# Patient Record
Sex: FEMALE | Race: WHITE | URBAN - METROPOLITAN AREA
[De-identification: names, ages, dates, MRNs, and addresses within clinical notes are randomized per-mention and may not be internally consistent; named-entity substitution may affect disease eponyms.]

---

## 2021-11-19 ENCOUNTER — OFFICE VISIT (OUTPATIENT)
Dept: URBAN - METROPOLITAN AREA CLINIC 19 | Facility: CLINIC | Age: 71
End: 2021-11-19
Payer: MEDICARE

## 2021-11-19 ENCOUNTER — WEB ENCOUNTER (OUTPATIENT)
Dept: URBAN - METROPOLITAN AREA CLINIC 19 | Facility: CLINIC | Age: 71
End: 2021-11-19

## 2021-11-19 ENCOUNTER — TELEPHONE ENCOUNTER (OUTPATIENT)
Dept: URBAN - METROPOLITAN AREA CLINIC 19 | Facility: CLINIC | Age: 71
End: 2021-11-19

## 2021-11-19 DIAGNOSIS — K59.09 CHANGE IN BOWEL MOVEMENTS INTERMITTENT CONSTIPATION. URGENCY IN THE MORNING.: ICD-10-CM

## 2021-11-19 DIAGNOSIS — Z86.010 HISTORY OF COLON POLYPS: ICD-10-CM

## 2021-11-19 DIAGNOSIS — R10.13 DYSPEPSIA: ICD-10-CM

## 2021-11-19 DIAGNOSIS — R12 HEARTBURN: ICD-10-CM

## 2021-11-19 DIAGNOSIS — K44.9 HIATAL HERNIA: ICD-10-CM

## 2021-11-19 PROCEDURE — 99204 OFFICE O/P NEW MOD 45 MIN: CPT | Performed by: INTERNAL MEDICINE

## 2021-11-19 PROCEDURE — 99244 OFF/OP CNSLTJ NEW/EST MOD 40: CPT | Performed by: INTERNAL MEDICINE

## 2021-11-19 RX ORDER — DEXLANSOPRAZOLE 60 MG/1
1 CAPSULE CAPSULE, DELAYED RELEASE ORAL ONCE A DAY
Qty: 90 CAPSULE | Refills: 3 | OUTPATIENT
Start: 2021-11-19

## 2021-11-19 RX ORDER — PLECANATIDE 3 MG/1
1 TABLET TABLET ORAL ONCE A DAY
Qty: 90 TABLET | Refills: 3 | OUTPATIENT
Start: 2021-12-02 | End: 2022-11-27

## 2021-11-19 RX ORDER — LINACLOTIDE 145 UG/1
1 CAPSULE AT LEAST 30 MINUTES BEFORE THE FIRST MEAL OF THE DAY ON AN EMPTY STOMACH CAPSULE, GELATIN COATED ORAL ONCE A DAY
Qty: 90 | Refills: 3 | OUTPATIENT
Start: 2021-11-19 | End: 2022-11-14

## 2021-11-19 RX ORDER — LINACLOTIDE 145 UG/1
1 CAPSULE AT LEAST 30 MINUTES BEFORE THE FIRST MEAL OF THE DAY ON AN EMPTY STOMACH CAPSULE, GELATIN COATED ORAL ONCE A DAY
Qty: 90 | Refills: 3 | Status: ACTIVE | COMMUNITY
Start: 2021-11-19 | End: 2022-11-14

## 2021-11-19 RX ORDER — DEXLANSOPRAZOLE 60 MG/1
1 CAPSULE CAPSULE, DELAYED RELEASE ORAL ONCE A DAY
Qty: 90 CAPSULE | Refills: 3 | Status: ACTIVE | COMMUNITY
Start: 2021-11-19

## 2022-01-03 ENCOUNTER — TELEPHONE ENCOUNTER (OUTPATIENT)
Dept: URBAN - METROPOLITAN AREA CLINIC 19 | Facility: CLINIC | Age: 72
End: 2022-01-03

## 2022-01-03 ENCOUNTER — OFFICE VISIT (OUTPATIENT)
Dept: URBAN - METROPOLITAN AREA MEDICAL CENTER 25 | Facility: MEDICAL CENTER | Age: 72
End: 2022-01-03
Payer: MEDICARE

## 2022-01-03 ENCOUNTER — OFFICE VISIT (OUTPATIENT)
Dept: URBAN - METROPOLITAN AREA LAB 2 | Facility: LAB | Age: 72
End: 2022-01-03

## 2022-01-03 DIAGNOSIS — K29.60 ADENOPAPILLOMATOSIS GASTRICA: ICD-10-CM

## 2022-01-03 DIAGNOSIS — K22.89 ESOPHAGEAL BLEEDING: ICD-10-CM

## 2022-01-03 DIAGNOSIS — R11.11 INTRACTABLE VOMITING WITHOUT NAUSEA: ICD-10-CM

## 2022-01-03 PROCEDURE — 43239 EGD BIOPSY SINGLE/MULTIPLE: CPT | Performed by: INTERNAL MEDICINE

## 2022-01-03 RX ORDER — DEXLANSOPRAZOLE 60 MG/1
1 CAPSULE CAPSULE, DELAYED RELEASE ORAL ONCE A DAY
Qty: 90 CAPSULE | Refills: 3 | Status: ACTIVE | COMMUNITY
Start: 2021-11-19

## 2022-01-03 RX ORDER — LINACLOTIDE 145 UG/1
1 CAPSULE AT LEAST 30 MINUTES BEFORE THE FIRST MEAL OF THE DAY ON AN EMPTY STOMACH CAPSULE, GELATIN COATED ORAL ONCE A DAY
Qty: 90 | Refills: 3 | Status: ACTIVE | COMMUNITY
Start: 2021-11-19 | End: 2022-11-14

## 2022-01-03 RX ORDER — PLECANATIDE 3 MG/1
1 TABLET TABLET ORAL ONCE A DAY
Qty: 90 TABLET | Refills: 3 | Status: ACTIVE | COMMUNITY
Start: 2021-12-02 | End: 2022-11-27

## 2022-01-11 PROBLEM — 263854006 REGURGITATION: Status: ACTIVE | Noted: 2022-01-03

## 2022-01-21 ENCOUNTER — TELEPHONE ENCOUNTER (OUTPATIENT)
Dept: URBAN - METROPOLITAN AREA CLINIC 19 | Facility: CLINIC | Age: 72
End: 2022-01-21

## 2022-01-21 RX ORDER — PLECANATIDE 3 MG/1
1 TABLET TABLET ORAL ONCE A DAY
Qty: 90 TABLET | Refills: 3 | Status: ACTIVE | COMMUNITY
Start: 2021-12-02 | End: 2022-11-27

## 2022-01-21 RX ORDER — FAMOTIDINE 40 MG/1
1 TABLET AT BEDTIME TABLET, FILM COATED ORAL ONCE A DAY
Qty: 90 TABLET | Refills: 1 | OUTPATIENT
Start: 2022-01-26

## 2022-01-21 RX ORDER — DEXLANSOPRAZOLE 60 MG/1
1 CAPSULE CAPSULE, DELAYED RELEASE ORAL ONCE A DAY
Qty: 90 CAPSULE | Refills: 3 | Status: ACTIVE | COMMUNITY
Start: 2021-11-19

## 2022-01-21 RX ORDER — LINACLOTIDE 145 UG/1
1 CAPSULE AT LEAST 30 MINUTES BEFORE THE FIRST MEAL OF THE DAY ON AN EMPTY STOMACH CAPSULE, GELATIN COATED ORAL ONCE A DAY
Qty: 90 | Refills: 3 | Status: ACTIVE | COMMUNITY
Start: 2021-11-19 | End: 2022-11-14

## 2022-01-24 ENCOUNTER — OFFICE VISIT (OUTPATIENT)
Dept: URBAN - METROPOLITAN AREA LAB 2 | Facility: LAB | Age: 72
End: 2022-01-24

## 2022-02-01 ENCOUNTER — TELEPHONE ENCOUNTER (OUTPATIENT)
Dept: URBAN - METROPOLITAN AREA CLINIC 19 | Facility: CLINIC | Age: 72
End: 2022-02-01

## 2022-02-04 ENCOUNTER — OFFICE VISIT (OUTPATIENT)
Dept: URBAN - METROPOLITAN AREA CLINIC 19 | Facility: CLINIC | Age: 72
End: 2022-02-04
Payer: MEDICARE

## 2022-02-04 ENCOUNTER — WEB ENCOUNTER (OUTPATIENT)
Dept: URBAN - METROPOLITAN AREA CLINIC 19 | Facility: CLINIC | Age: 72
End: 2022-02-04

## 2022-02-04 DIAGNOSIS — R10.13 DYSPEPSIA: ICD-10-CM

## 2022-02-04 DIAGNOSIS — R12 HEARTBURN: ICD-10-CM

## 2022-02-04 DIAGNOSIS — Z86.010 HISTORY OF COLON POLYPS: ICD-10-CM

## 2022-02-04 DIAGNOSIS — K44.9 HIATAL HERNIA: ICD-10-CM

## 2022-02-04 PROBLEM — 162031009 DYSPEPSIA: Status: ACTIVE | Noted: 2021-11-19

## 2022-02-04 PROCEDURE — 99213 OFFICE O/P EST LOW 20 MIN: CPT | Performed by: INTERNAL MEDICINE

## 2022-02-04 RX ORDER — LINACLOTIDE 145 UG/1
1 CAPSULE AT LEAST 30 MINUTES BEFORE THE FIRST MEAL OF THE DAY ON AN EMPTY STOMACH CAPSULE, GELATIN COATED ORAL ONCE A DAY
Qty: 90 | Refills: 3 | Status: ACTIVE | COMMUNITY
Start: 2021-11-19 | End: 2022-11-14

## 2022-02-04 RX ORDER — DEXLANSOPRAZOLE 60 MG/1
1 CAPSULE CAPSULE, DELAYED RELEASE ORAL ONCE A DAY
Qty: 90 CAPSULE | Refills: 3 | Status: ACTIVE | COMMUNITY
Start: 2021-11-19

## 2022-02-04 RX ORDER — FAMOTIDINE 40 MG/1
1 TABLET AT BEDTIME TABLET, FILM COATED ORAL ONCE A DAY
Qty: 90 TABLET | Refills: 1 | Status: ACTIVE | COMMUNITY
Start: 2022-01-26

## 2022-02-04 RX ORDER — PLECANATIDE 3 MG/1
1 TABLET TABLET ORAL ONCE A DAY
Qty: 90 TABLET | Refills: 3 | Status: ACTIVE | COMMUNITY
Start: 2021-12-02 | End: 2022-11-27

## 2022-02-04 NOTE — HPI-TODAY'S VISIT:
11/19/21: Patient presents as a referral from Dr. Bryant Welsh for evaluation of reflux/hiatal hernia.  A copy of this note will be sent to the referring provider.  The patient states that she has had heartburn for a long time - she actually had an EGD done in [place] when she was having heartburn/dysphagia where the endoscopist dilated her esophagus.  Her symptoms seem to resolve after that; she takes occasional Gaviscon for nighttime reflux/dyspepsia.  She saw Dr. Dionicio Cummins who had prescribed pantoprazole (and famotidine) - she states that she could not tolerate those.  Dr. Welsh placed her on Dexilant - it seems to have helped.  She believes that her 3-pound weight gain is related to the Dexilant as well as constipation.  I do not think so - these side effects would be rare and the timing is not quite right.  She had been gaining weight for months before, and her constipation/bloating issues have been going on for a while as well.  She uses OTC laxatives without success.  Dr. Cummins did a colonoscopy last year - 2 polyps removed.  2/4/22:  Patient came in for urgent consultation.  I performed her EGD on 1/3/22 and found minimal reflux esophagitis with focal intestinal metaplasia.  However, she states that her symptoms are severe.  With her upper GI series showing only a small hiatal hernia, I decided to order esophageal manometry and a 24-hour esophageal pH/impedance, which is scheduled for later this month.  However, the patient states that she cannot tolerate all of the medications prescribed so far.  Dexilant made her face "swell up", so she stopped that.  As stated above, she could not tolerate pantoprazole.  I prescribed famotidine - she states that that caused bladder spasms.  I told her today that all of these symptoms may be misinterpreted as side effects of the drugs, but it makes sense to confirm that her dyspepsia/heartburn symptoms are truly due to GERD before pursuing other therapies.

## 2022-02-22 ENCOUNTER — CLAIMS CREATED FROM THE CLAIM WINDOW (OUTPATIENT)
Dept: URBAN - METROPOLITAN AREA MEDICAL CENTER 28 | Facility: MEDICAL CENTER | Age: 72
End: 2022-02-22

## 2022-02-22 ENCOUNTER — CLAIMS CREATED FROM THE CLAIM WINDOW (OUTPATIENT)
Dept: URBAN - METROPOLITAN AREA MEDICAL CENTER 28 | Facility: MEDICAL CENTER | Age: 72
End: 2022-02-22
Payer: MEDICARE

## 2022-02-22 DIAGNOSIS — R11.10 ACUTE VOMITING: ICD-10-CM

## 2022-02-22 PROCEDURE — 91010 ESOPHAGUS MOTILITY STUDY: CPT | Performed by: INTERNAL MEDICINE

## 2022-03-15 ENCOUNTER — WEB ENCOUNTER (OUTPATIENT)
Dept: URBAN - METROPOLITAN AREA CLINIC 19 | Facility: CLINIC | Age: 72
End: 2022-03-15

## 2022-03-21 ENCOUNTER — OFFICE VISIT (OUTPATIENT)
Dept: URBAN - METROPOLITAN AREA CLINIC 19 | Facility: CLINIC | Age: 72
End: 2022-03-21
Payer: MEDICARE

## 2022-03-21 DIAGNOSIS — K44.9 HIATAL HERNIA: ICD-10-CM

## 2022-03-21 DIAGNOSIS — K22.4 ESOPHAGEAL DYSMOTILITY: ICD-10-CM

## 2022-03-21 DIAGNOSIS — R12 HEARTBURN: ICD-10-CM

## 2022-03-21 PROCEDURE — 99213 OFFICE O/P EST LOW 20 MIN: CPT | Performed by: INTERNAL MEDICINE

## 2022-03-21 RX ORDER — DEXLANSOPRAZOLE 60 MG/1
1 CAPSULE CAPSULE, DELAYED RELEASE ORAL ONCE A DAY
Qty: 90 CAPSULE | Refills: 3 | Status: ACTIVE | COMMUNITY
Start: 2021-11-19

## 2022-03-21 RX ORDER — FAMOTIDINE 40 MG/1
1 TABLET AT BEDTIME TABLET, FILM COATED ORAL ONCE A DAY
Qty: 90 TABLET | Refills: 1 | Status: ACTIVE | COMMUNITY
Start: 2022-01-26

## 2022-03-21 RX ORDER — PLECANATIDE 3 MG/1
1 TABLET TABLET ORAL ONCE A DAY
Qty: 90 TABLET | Refills: 3 | Status: ACTIVE | COMMUNITY
Start: 2021-12-02 | End: 2022-11-27

## 2022-03-21 RX ORDER — LINACLOTIDE 145 UG/1
1 CAPSULE AT LEAST 30 MINUTES BEFORE THE FIRST MEAL OF THE DAY ON AN EMPTY STOMACH CAPSULE, GELATIN COATED ORAL ONCE A DAY
Qty: 90 | Refills: 3 | Status: ACTIVE | COMMUNITY
Start: 2021-11-19 | End: 2022-11-14

## 2022-03-21 NOTE — HPI-TODAY'S VISIT:
11/19/21: Patient presents as a referral from Dr. Bryant Welsh for evaluation of reflux/hiatal hernia.  A copy of this note will be sent to the referring provider.  The patient states that she has had heartburn for a long time - she actually had an EGD done in [place] when she was having heartburn/dysphagia where the endoscopist dilated her esophagus.  Her symptoms seem to resolve after that; she takes occasional Gaviscon for nighttime reflux/dyspepsia.  She saw Dr. Dionicio Cummins who had prescribed pantoprazole (and famotidine) - she states that she could not tolerate those.  Dr. Welsh placed her on Dexilant - it seems to have helped.  She believes that her 3-pound weight gain is related to the Dexilant as well as constipation.  I do not think so - these side effects would be rare and the timing is not quite right.  She had been gaining weight for months before, and her constipation/bloating issues have been going on for a while as well.  She uses OTC laxatives without success.  Dr. Cummins did a colonoscopy last year - 2 polyps removed.  2/4/22:  Patient came in for urgent consultation.  I performed her EGD on 1/3/22 and found minimal reflux esophagitis with focal intestinal metaplasia.  However, she states that her symptoms are severe.  With her upper GI series showing only a small hiatal hernia, I decided to order esophageal manometry and a 24-hour esophageal pH/impedance, which is scheduled for later this month.  However, the patient states that she cannot tolerate all of the medications prescribed so far.  Dexilant made her face "swell up", so she stopped that.  As stated above, she could not tolerate pantoprazole.  I prescribed famotidine - she states that that caused bladder spasms.  I told her today that all of these symptoms may be misinterpreted as side effects of the drugs, but it makes sense to confirm that her dyspepsia/heartburn symptoms are truly due to GERD before pursuing other therapies.  3/21/22:  Patient presents for reevaluation of her GERD issues.  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  I was going to refer patient to a general surgery for hiatal hernia repair and possibly ENT for evaluation of cricopharyngeal achalasia, but the patient actually feels much better since the test.  She denies any heartburn or dysphagia.  We agreed to monitor her symptoms for now and only refer if her symptoms recur.

## 2022-07-18 ENCOUNTER — ERX REFILL RESPONSE (OUTPATIENT)
Dept: URBAN - METROPOLITAN AREA CLINIC 19 | Facility: CLINIC | Age: 72
End: 2022-07-18

## 2022-07-18 RX ORDER — FAMOTIDINE 40 MG/1
1 TABLET AT BEDTIME TABLET, FILM COATED ORAL ONCE A DAY
Qty: 90 TABLET | Refills: 1 | OUTPATIENT

## 2022-07-18 RX ORDER — FAMOTIDINE 40 MG/1
TAKE ONE TABLET BY MOUTH ONE TIME DAILY AT BEDTIME TABLET, FILM COATED ORAL
Qty: 90 TABLET | Refills: 3 | OUTPATIENT

## 2022-07-27 ENCOUNTER — ERX REFILL RESPONSE (OUTPATIENT)
Dept: URBAN - METROPOLITAN AREA CLINIC 19 | Facility: CLINIC | Age: 72
End: 2022-07-27

## 2022-07-27 RX ORDER — FAMOTIDINE 40 MG/1
TAKE ONE TABLET BY MOUTH ONE TIME DAILY AT BEDTIME TABLET, FILM COATED ORAL
Qty: 90 TABLET | Refills: 3 | OUTPATIENT

## 2022-09-12 ENCOUNTER — DASHBOARD ENCOUNTERS (OUTPATIENT)
Age: 72
End: 2022-09-12

## 2022-09-12 ENCOUNTER — OFFICE VISIT (OUTPATIENT)
Dept: URBAN - METROPOLITAN AREA CLINIC 19 | Facility: CLINIC | Age: 72
End: 2022-09-12
Payer: MEDICARE

## 2022-09-12 VITALS
SYSTOLIC BLOOD PRESSURE: 102 MMHG | WEIGHT: 155.8 LBS | DIASTOLIC BLOOD PRESSURE: 82 MMHG | TEMPERATURE: 98.3 F | BODY MASS INDEX: 27.61 KG/M2 | HEART RATE: 47 BPM | HEIGHT: 63 IN

## 2022-09-12 DIAGNOSIS — K22.4 ESOPHAGEAL DYSMOTILITY: ICD-10-CM

## 2022-09-12 DIAGNOSIS — K59.01 CONSTIPATION: ICD-10-CM

## 2022-09-12 DIAGNOSIS — Z86.010 HISTORY OF COLON POLYPS: ICD-10-CM

## 2022-09-12 DIAGNOSIS — R12 HEARTBURN: ICD-10-CM

## 2022-09-12 DIAGNOSIS — K44.9 HIATAL HERNIA: ICD-10-CM

## 2022-09-12 PROBLEM — 84089009 HIATAL HERNIA: Status: ACTIVE | Noted: 2021-11-19

## 2022-09-12 PROBLEM — 428283002 HISTORY OF POLYP OF COLON: Status: ACTIVE | Noted: 2021-11-19

## 2022-09-12 PROBLEM — 266434009 ESOPHAGEAL DYSMOTILITY: Status: ACTIVE | Noted: 2022-03-21

## 2022-09-12 PROBLEM — 14760008 CONSTIPATION: Status: ACTIVE | Noted: 2021-11-19

## 2022-09-12 PROBLEM — 16331000 HEARTBURN: Status: ACTIVE | Noted: 2021-11-19

## 2022-09-12 PROCEDURE — 99213 OFFICE O/P EST LOW 20 MIN: CPT | Performed by: INTERNAL MEDICINE

## 2022-09-12 RX ORDER — DEXLANSOPRAZOLE 60 MG/1
1 CAPSULE CAPSULE, DELAYED RELEASE ORAL ONCE A DAY
Qty: 90 CAPSULE | Refills: 3 | Status: DISCONTINUED | COMMUNITY
Start: 2021-11-19

## 2022-09-12 RX ORDER — FAMOTIDINE 40 MG/1
TAKE ONE TABLET BY MOUTH ONE TIME DAILY AT BEDTIME TABLET, FILM COATED ORAL
Qty: 90 TABLET | Refills: 3 | Status: DISCONTINUED | COMMUNITY

## 2022-09-12 RX ORDER — PLECANATIDE 3 MG/1
1 TABLET TABLET ORAL ONCE A DAY
Qty: 90 TABLET | Refills: 3 | Status: DISCONTINUED | COMMUNITY
Start: 2021-12-02 | End: 2022-11-27

## 2022-09-12 RX ORDER — LINACLOTIDE 145 UG/1
1 CAPSULE AT LEAST 30 MINUTES BEFORE THE FIRST MEAL OF THE DAY ON AN EMPTY STOMACH CAPSULE, GELATIN COATED ORAL ONCE A DAY
Qty: 90 | Refills: 3 | Status: DISCONTINUED | COMMUNITY
Start: 2021-11-19 | End: 2022-11-14

## 2022-09-12 NOTE — HPI-TODAY'S VISIT:
11/19/21: Patient presents as a referral from Dr. Bryant Welsh for evaluation of reflux/hiatal hernia.  A copy of this note will be sent to the referring provider.  The patient states that she has had heartburn for a long time - she actually had an EGD done in [place] when she was having heartburn/dysphagia where the endoscopist dilated her esophagus.  Her symptoms seem to resolve after that; she takes occasional Gaviscon for nighttime reflux/dyspepsia.  She saw Dr. Dionicio Cummins who had prescribed pantoprazole (and famotidine) - she states that she could not tolerate those.  Dr. Welsh placed her on Dexilant - it seems to have helped.  She believes that her 3-pound weight gain is related to the Dexilant as well as constipation.  I do not think so - these side effects would be rare and the timing is not quite right.  She had been gaining weight for months before, and her constipation/bloating issues have been going on for a while as well.  She uses OTC laxatives without success.  Dr. Cummins did a colonoscopy last year - 2 polyps removed.  2/4/22:  Patient came in for urgent consultation.  I performed her EGD on 1/3/22 and found minimal reflux esophagitis with focal intestinal metaplasia.  However, she states that her symptoms are severe.  With her upper GI series showing only a small hiatal hernia, I decided to order esophageal manometry and a 24-hour esophageal pH/impedance, which is scheduled for later this month.  However, the patient states that she cannot tolerate all of the medications prescribed so far.  Dexilant made her face "swell up", so she stopped that.  As stated above, she could not tolerate pantoprazole.  I prescribed famotidine - she states that that caused bladder spasms.  I told her today that all of these symptoms may be misinterpreted as side effects of the drugs, but it makes sense to confirm that her dyspepsia/heartburn symptoms are truly due to GERD before pursuing other therapies.  3/21/22:  Patient presents for reevaluation of her GERD issues.  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  I was going to refer patient to a general surgery for hiatal hernia repair and possibly ENT for evaluation of cricopharyngeal achalasia, but the patient actually feels much better since the test.  She denies any heartburn or dysphagia.  We agreed to monitor her symptoms for now and only refer if her symptoms recur.  9/12/22:  Patient presents for follow-up.  She is doing well - not on any PPI or H2-blocker.  She takes Gaviscon occasionally.  No dysphagia or heartburn.  She is not constipated, although she occasionally will use a suppository.  Not due for colon polyp surveillance until 2025.

## 2022-09-19 ENCOUNTER — OFFICE VISIT (OUTPATIENT)
Dept: URBAN - METROPOLITAN AREA CLINIC 19 | Facility: CLINIC | Age: 72
End: 2022-09-19